# Patient Record
(demographics unavailable — no encounter records)

---

## 2024-10-22 NOTE — HISTORY OF PRESENT ILLNESS
[FreeTextEntry8] : pt c/o pain in shoulder   3-4 week hx of R shoulder pain  no specific trauma to area at this time, however,  hx of clavicular Rx 2008 c sequala of chronic bursitis R shoulder  Now c decreased ROM and decreased strength 2n2 pain +ve disruption of sleep   reports some relief c Rx Flexeril 10mg prn  Pain currently a 6/10 at rest  Pain radiates from R shoulder down RUE    +ve occasional pareshthesia R hand   +ve neck pain

## 2024-10-22 NOTE — PHYSICAL EXAM
[No Acute Distress] : no acute distress [EOMI] : extraocular movements intact [Normal Outer Ear/Nose] : the outer ears and nose were normal in appearance [No JVD] : no jugular venous distention [No Respiratory Distress] : no respiratory distress  [No Accessory Muscle Use] : no accessory muscle use [Clear to Auscultation] : lungs were clear to auscultation bilaterally [Normal Rate] : normal rate  [Regular Rhythm] : with a regular rhythm [Normal S1, S2] : normal S1 and S2 [No Carotid Bruits] : no carotid bruits [No Edema] : there was no peripheral edema [No Palpable Aorta] : no palpable aorta [Non-distended] : non-distended [No CVA Tenderness] : no CVA  tenderness [No Rash] : no rash [Coordination Grossly Intact] : coordination grossly intact [No Focal Deficits] : no focal deficits [Normal Gait] : normal gait [Normal Affect] : the affect was normal [Normal Mood] : the mood was normal [Normal Insight/Judgement] : insight and judgment were intact [de-identified] : no C or Tspine TTP  [de-identified] : decreased ROM and decreased strength RUE 2n2 pain

## 2024-11-08 NOTE — PHYSICAL EXAM
[No Acute Distress] : no acute distress [EOMI] : extraocular movements intact [Normal Outer Ear/Nose] : the outer ears and nose were normal in appearance [No JVD] : no jugular venous distention [No Respiratory Distress] : no respiratory distress  [No Accessory Muscle Use] : no accessory muscle use [Clear to Auscultation] : lungs were clear to auscultation bilaterally [Normal Rate] : normal rate  [Regular Rhythm] : with a regular rhythm [Normal S1, S2] : normal S1 and S2 [No Carotid Bruits] : no carotid bruits [No Edema] : there was no peripheral edema [No Palpable Aorta] : no palpable aorta [Non-distended] : non-distended [No CVA Tenderness] : no CVA  tenderness [No Rash] : no rash [Coordination Grossly Intact] : coordination grossly intact [No Focal Deficits] : no focal deficits [Normal Gait] : normal gait [Normal Affect] : the affect was normal [Normal Mood] : the mood was normal [Normal Insight/Judgement] : insight and judgment were intact [de-identified] : C and T-spine NT to palpation  [de-identified] : decreased ROM and decreased strength RUE 2n2 pain  +ve palpable hypertonicity R parathoracic region and R pectoral region

## 2024-11-08 NOTE — HISTORY OF PRESENT ILLNESS
[FreeTextEntry1] : Follow up Right shoulder pain. Pt stated pain goes from shoulder blade down right arm.  [de-identified] : 51 yo female for eval of R shoulder pain.   pt states Rx'ed Methocarbamol did NOT help, however, OTC Aleve 1-2 tabs qd-bid is helping reduce pain.  Pain still present but no longer disrupting sleep  ROM limited 2n2 pain   +ve pain "When I am trying to shave under my arms"    Recent R shoulder U/S revealed "bursitis"

## 2025-06-02 NOTE — HISTORY OF PRESENT ILLNESS
[FreeTextEntry8] : 53 yo female presents with complaint of sore throat, nasal congestion, cough. Cough is largely dry, nonproductive, phlegm can be clear. She notes symptoms began 4 weeks ago and were initally sneezing, watery eyes but symptoms worsened. Denies fever, chills, cp, palpitations, sob, nvcd.

## 2025-06-02 NOTE — REVIEW OF SYSTEMS
[Nasal Discharge] : nasal discharge [Sore Throat] : sore throat [Shortness Of Breath] : no shortness of breath [Wheezing] : no wheezing [Cough] : cough [Dyspnea on Exertion] : no dyspnea on exertion [Negative] : Psychiatric

## 2025-06-02 NOTE — PHYSICAL EXAM
[Normal] : affect was normal and insight and judgment were intact [de-identified] : mild pharyngeal erythema, no exudates

## 2025-06-02 NOTE — ASSESSMENT
[FreeTextEntry1] : Acute URI: poct rapid strep negative, recommend supportive care, start tylenol prn for fever/pain, recommend increased hydration, call EMS if symptoms worsen or develop sob. Recommend hand hygiene, cover mouth when coughing, wash hands frequently, start clarithromycin 300 mg po tid x 10 days, f/u covid19/viral panel ordered Allergies: c/w claritin 10 mg po daily prn RTC 2 wks

## 2025-06-19 NOTE — PHYSICAL EXAM
[No Acute Distress] : no acute distress [EOMI] : extraocular movements intact [Normal Outer Ear/Nose] : the outer ears and nose were normal in appearance [No JVD] : no jugular venous distention [No Respiratory Distress] : no respiratory distress  [No Accessory Muscle Use] : no accessory muscle use [Clear to Auscultation] : lungs were clear to auscultation bilaterally [Normal Rate] : normal rate  [Regular Rhythm] : with a regular rhythm [Normal S1, S2] : normal S1 and S2 [No Carotid Bruits] : no carotid bruits [No Edema] : there was no peripheral edema [No Palpable Aorta] : no palpable aorta [Non-distended] : non-distended [No CVA Tenderness] : no CVA  tenderness [No Rash] : no rash [Coordination Grossly Intact] : coordination grossly intact [No Focal Deficits] : no focal deficits [Normal Gait] : normal gait [Normal Affect] : the affect was normal [Normal Mood] : the mood was normal [Normal Insight/Judgement] : insight and judgment were intact

## 2025-06-19 NOTE — HEALTH RISK ASSESSMENT
[Patient reported colonoscopy was normal] : Patient reported colonoscopy was normal [MammogramDate] : 06/25 [MammogramComments] : scheduled today 6/19/25  results pending  [PapSmearDate] : 07/25 [PapSmearComments] : scheduled 7/3/25 results pending c Dr. Aguirre  [ColonoscopyDate] : 09/23 [ColonoscopyComments] : repeat 5 yrs

## 2025-06-19 NOTE — HISTORY OF PRESENT ILLNESS
[FreeTextEntry1] : CPE [de-identified] : CPE  as above +ve FAST no CP/SOB c activity no dizziness no  palpitations no N/V/D +BM qod NL no bloody/black stools  no acute change in bowel  habits no urinary complaints

## 2025-06-19 NOTE — PLAN
[FreeTextEntry1] : EKG performed: SR at 80 bpm, normal axis, no ST/T changes    cont adequate hydration!!!!!!  Trial of Miralax 1/2 cap qod     see lab orders   see radiology orders x 2   see med orders

## 2025-07-03 NOTE — HISTORY OF PRESENT ILLNESS
[Patient reported mammogram was normal] : Patient reported mammogram was normal [FreeTextEntry1] : Sofie is a pleasant 51 y/o F presents for an annual well woman gyn visit. Reports she is doing well. Recently had mammogram 06/19/2025 Bi-Rads 2. LMP was 02/01/2024. Denies any VB since LMP. Denies any pelvic pain/pressure. Denies GYN complaints.  Pt is a . Mother lives in [place] with her sister to help babysit.  [Mammogramdate] : 06/19/25 [TextBox_19] : Bi-Rads 2 [BoneDensityDate] : 06/19/25 [TextBox_37] : Osteopenia

## 2025-07-03 NOTE — PLAN
[FreeTextEntry1] : 53 y/o GYN annual -pap w/ HPV -SBE reviewed -Mammo due 06/2026 -Achieve Vit D levels of 30-40, intake of 1100 mg daily calcium.   RTO for gyn annual and prn.

## 2025-07-03 NOTE — PHYSICAL EXAM
[Appropriately responsive] : appropriately responsive [Alert] : alert [No Acute Distress] : no acute distress [Soft] : soft [Non-tender] : non-tender [Non-distended] : non-distended [No HSM] : No HSM [No Lesions] : no lesions [No Mass] : no mass [Oriented x3] : oriented x3 [Examination Of The Breasts] : a normal appearance [No Masses] : no breast masses were palpable [Labia Majora] : normal [Labia Minora] : normal [Normal] : normal [No Discharge] : no discharge [No Bleeding] : There was no active vaginal bleeding

## 2025-07-24 NOTE — ASSESSMENT
[FreeTextEntry1] : 53 y/o female presents with complaints of left hip area insect bite going on for 2 weeks. Pt. c/o nasal congestion/ headaches/ fatigue going on for a day and body aches/ chills for 3 days. She has been having ear itching and feeling of liquid in the right ear. She denies fever and is not on any meds OTC for her symptoms currently. She has been exposed to two sick coworkers last week. She is on allegra- D and flonase regularly.  Possible Tick Bite- Will have pt check labs in about 2 more weeks since symptoms dont seem like lyme currently. Insect Bite reaction- Pt to apply clobetasol that she has at home to the area of concern Chills/Sinusitis- Rapid Flu neg, RVP sent. Will have pt do Mucinex without decongestant due to HTN, and she is already on Allegra-D and will have her switch to regular claritin or allegra. Will have pt alternate with flonase and ipratropium nasal sprays. Nasal saline rinse suggested and rest and fluids.

## 2025-07-24 NOTE — HISTORY OF PRESENT ILLNESS
[FreeTextEntry8] : Pt. c/o left hip area tick bit going on for 2 weeks. Pt. c/o nasal congestion/ headaches/ fatigue going on for a day and body aches/ chills for 3 days. She has been having ear itching and feeling of liquid in the right ear. She denies fever and is not on any meds OTC for her symptoms currently. She has been exposed to two sick coworkers last week. She is on allegra- D and flonase regularly.

## 2025-07-24 NOTE — PHYSICAL EXAM
[No Acute Distress] : no acute distress [Well Nourished] : well nourished [Well Developed] : well developed [Ill-Appearing] : ill-appearing [Normal Sclera/Conjunctiva] : normal sclera/conjunctiva [EOMI] : extraocular movements intact [Normal Outer Ear/Nose] : the outer ears and nose were normal in appearance [Normal Oropharynx] : the oropharynx was normal [Normal TMs] : both tympanic membranes were normal [Normal Nasal Mucosa] : the nasal mucosa was normal [No JVD] : no jugular venous distention [No Lymphadenopathy] : no lymphadenopathy [Supple] : supple [Thyroid Normal, No Nodules] : the thyroid was normal and there were no nodules present [No Respiratory Distress] : no respiratory distress  [No Accessory Muscle Use] : no accessory muscle use [Clear to Auscultation] : lungs were clear to auscultation bilaterally [Normal Rate] : normal rate  [Regular Rhythm] : with a regular rhythm [Normal S1, S2] : normal S1 and S2 [No Murmur] : no murmur heard [No Varicosities] : no varicosities [No Edema] : there was no peripheral edema [No Extremity Clubbing/Cyanosis] : no extremity clubbing/cyanosis [Soft] : abdomen soft [Non Tender] : non-tender [Non-distended] : non-distended [Normal Posterior Cervical Nodes] : no posterior cervical lymphadenopathy [Normal Anterior Cervical Nodes] : no anterior cervical lymphadenopathy [No Spinal Tenderness] : no spinal tenderness [No Joint Swelling] : no joint swelling [Grossly Normal Strength/Tone] : grossly normal strength/tone [No Rash] : no rash [Coordination Grossly Intact] : coordination grossly intact [No Focal Deficits] : no focal deficits [Normal Gait] : normal gait [Normal Affect] : the affect was normal [Normal Insight/Judgement] : insight and judgment were intact